# Patient Record
Sex: MALE | Race: WHITE | Employment: FULL TIME | RURAL
[De-identification: names, ages, dates, MRNs, and addresses within clinical notes are randomized per-mention and may not be internally consistent; named-entity substitution may affect disease eponyms.]

---

## 2021-04-26 ENCOUNTER — HOSPITAL ENCOUNTER (EMERGENCY)
Age: 31
Discharge: HOME | End: 2021-04-27
Attending: FAMILY MEDICINE

## 2021-04-26 ENCOUNTER — APPOINTMENT (OUTPATIENT)
Dept: GENERAL RADIOLOGY | Age: 31
End: 2021-04-26
Attending: FAMILY MEDICINE

## 2021-04-26 VITALS
RESPIRATION RATE: 16 BRPM | BODY MASS INDEX: 26.36 KG/M2 | TEMPERATURE: 98 F | WEIGHT: 212 LBS | DIASTOLIC BLOOD PRESSURE: 77 MMHG | HEIGHT: 75 IN | HEART RATE: 72 BPM | SYSTOLIC BLOOD PRESSURE: 137 MMHG | OXYGEN SATURATION: 96 %

## 2021-04-26 DIAGNOSIS — S93.601A SPRAIN OF RIGHT FOOT, INITIAL ENCOUNTER: Primary | ICD-10-CM

## 2021-04-26 PROCEDURE — 73630 X-RAY EXAM OF FOOT: CPT

## 2021-04-26 PROCEDURE — 99282 EMERGENCY DEPT VISIT SF MDM: CPT

## 2021-04-26 RX ORDER — TACROLIMUS 0.3 MG/G
OINTMENT TOPICAL 2 TIMES DAILY
COMMUNITY
Start: 2021-03-05

## 2021-04-26 RX ORDER — CLOBETASOL PROPIONATE 0.46 MG/ML
SOLUTION TOPICAL 2 TIMES DAILY
COMMUNITY
Start: 2021-03-05

## 2021-04-26 RX ORDER — DESONIDE 0.5 MG/G
OINTMENT TOPICAL 2 TIMES DAILY
COMMUNITY
Start: 2021-03-05

## 2021-04-27 NOTE — ED TRIAGE NOTES
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  Pain mid foot with redness

## 2021-04-27 NOTE — ED PROVIDER NOTES
EMERGENCY DEPARTMENT HISTORY AND PHYSICAL EXAM          Date: 4/26/2021  Patient Name: Lidia Burnett    History of Presenting Illness     Chief Complaint   Patient presents with    Foot Pain       History Provided By: Patient    HPI: Lidia Burnett is a 35 y.o. male, without significant pmhx , who presents  to the ED c/o pain and swelling in his right foot after a weekend of marching. Pt is employed by the [employer] and about a year ago he sprained his right foot when he jumped off an embankment, rolling his ankle. He recovered from that after 1-2 weeks, but this weekend, he was on exercises and the pain/swelling recurred. He was told by his commanding officer to get an Xray to make sure that it was not broken. He is able to bear weight, but with a slight limp. He had been using an ace wrap with good relief of the symptoms. Patient specifically denies any recent fevers, chills, nausea, vomiting, diarrhea, abd pain, CP, SOB, urinary sxs, changes in BM, or headache. PCP: None    Allergies:   Social Hx: tobacco, vaping, EtOH, Illicit Drugs; Lives alone    There are no other complaints, changes, or physical findings at this time. Current Outpatient Medications   Medication Sig Dispense Refill    clobetasoL (TEMOVATE) 0.05 % external solution Apply  to affected area two (2) times a day.  desonide (DESOWEN) 0.05 % topical ointment Apply  to affected area two (2) times a day.  tacrolimus (PROTOPIC) 0.03 % ointment Apply  to affected area two (2) times a day. Past History     Past Medical History:  History reviewed. No pertinent past medical history. Past Surgical History:  History reviewed. No pertinent surgical history. Family History:  History reviewed. No pertinent family history.     Social History:  Social History     Tobacco Use    Smoking status: Never Smoker    Smokeless tobacco: Never Used   Substance Use Topics    Alcohol use: Not Currently    Drug use: Never Allergies:  No Known Allergies      Review of Systems   Review of Systems   Constitutional: Positive for activity change. HENT: Negative for dental problem, nosebleeds, rhinorrhea and trouble swallowing. Cardiovascular: Negative for chest pain and leg swelling. Gastrointestinal: Negative for diarrhea and nausea. Musculoskeletal: Positive for gait problem and joint swelling. Negative for back pain and neck pain. Physical Exam   Physical Exam  Constitutional:       Appearance: Normal appearance. HENT:      Head: Normocephalic. Right Ear: External ear normal.      Left Ear: External ear normal.      Nose: Congestion present. Mouth/Throat:      Mouth: Mucous membranes are moist.   Cardiovascular:      Rate and Rhythm: Normal rate. Pulmonary:      Effort: Pulmonary effort is normal.   Musculoskeletal:      Right foot: Normal range of motion and normal capillary refill. Tenderness, bony tenderness and swelling present. No crepitus or deformity. Feet:    Neurological:      Mental Status: He is alert. Diagnostic Study Results       Radiologic Studies -   XR FOOT RT MIN 3 V   Final Result   No acute fracture or dislocation. Medical Decision Making   I am the first provider for this patient. I reviewed the vital signs, available nursing notes, past medical history, past surgical history, family history and social history. Vital Signs-Reviewed the patient's vital signs. Patient Vitals for the past 12 hrs:   Temp Pulse Resp BP SpO2   04/26/21 2307 98 °F (36.7 °C) 72 16 137/77 96 %       Pulse Oximetry Analysis - 96% on RA    Records Reviewed: Old Medical Records    Provider Notes (Medical Decision Making):   MDM:  DDx: Sprain vs fracture of 4th metacarpal vs fx 5th metacarpal    ED Course:   Initial assessment performed. The patient's presenting problems have been discussed, and they are in agreement with the care plan formulated and outlined with them.   I have encouraged them to ask questions as they arise throughout their visit. PROGRESS NOTE:    Discussed results of Xray with patient. He is comfortable with the findings. Discharge note:    Comfortable with discharge, f/u orthopedics. Diagnosis     Clinical Impression:   1. Sprain of right foot, initial encounter        PLAN:  1. Ibuprofen/Rest/ Ice/ Ace when weight bearing. Discharge Medication List as of 4/27/2021 12:09 AM        2. Follow-up Information     Follow up With Specialties Details Why Contact Info    Jeffery Keller MD Orthopedic Surgery In 1 week If symptoms worsen [facility] [address] [place] [address] [id number] [facility]  [phone]          Return to ED if worse     Disposition:  Home       . García Roy MD

## 2021-04-27 NOTE — DISCHARGE INSTRUCTIONS
--Ibuprofen 600 mg every 6 hours as needed for pain. --Continue with ice, compression, elevation and rest as much as possible over the next few days.

## 2023-05-15 RX ORDER — CLOBETASOL PROPIONATE 0.46 MG/ML
SOLUTION TOPICAL 2 TIMES DAILY
COMMUNITY
Start: 2021-03-05

## 2023-05-15 RX ORDER — DESONIDE 0.5 MG/G
OINTMENT TOPICAL 2 TIMES DAILY
COMMUNITY
Start: 2021-03-05

## 2023-05-15 RX ORDER — TACROLIMUS 0.3 MG/G
OINTMENT TOPICAL 2 TIMES DAILY
COMMUNITY
Start: 2021-03-05